# Patient Record
Sex: MALE | Race: WHITE | Employment: FULL TIME | URBAN - METROPOLITAN AREA
[De-identification: names, ages, dates, MRNs, and addresses within clinical notes are randomized per-mention and may not be internally consistent; named-entity substitution may affect disease eponyms.]

---

## 2021-07-22 ENCOUNTER — HOSPITAL ENCOUNTER (EMERGENCY)
Age: 26
Discharge: HOME | End: 2021-07-22
Payer: COMMERCIAL

## 2021-07-22 ENCOUNTER — APPOINTMENT (OUTPATIENT)
Dept: GENERAL RADIOLOGY | Age: 26
End: 2021-07-22
Payer: COMMERCIAL

## 2021-07-22 VITALS
SYSTOLIC BLOOD PRESSURE: 128 MMHG | HEIGHT: 70 IN | DIASTOLIC BLOOD PRESSURE: 74 MMHG | RESPIRATION RATE: 18 BRPM | BODY MASS INDEX: 28.06 KG/M2 | WEIGHT: 196 LBS | HEART RATE: 86 BPM | OXYGEN SATURATION: 97 % | TEMPERATURE: 98.2 F
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
## 2021-07-22 DIAGNOSIS — S63.502A SPRAIN OF LEFT WRIST, INITIAL ENCOUNTER: Primary | ICD-10-CM

## 2021-07-22 PROCEDURE — 99282 EMERGENCY DEPT VISIT SF MDM: CPT

## 2021-07-22 PROCEDURE — 29125 APPL SHORT ARM SPLINT STATIC: CPT

## 2021-07-22 PROCEDURE — 73110 X-RAY EXAM OF WRIST: CPT

## 2021-07-22 RX ORDER — IBUPROFEN 200 MG
200 TABLET ORAL EVERY 6 HOURS PRN
COMMUNITY

## 2021-07-22 ASSESSMENT — ENCOUNTER SYMPTOMS
SORE THROAT: 0
SHORTNESS OF BREATH: 0
NAUSEA: 0
CHEST TIGHTNESS: 0
BACK PAIN: 0
ABDOMINAL PAIN: 0
VOMITING: 0

## 2021-07-22 ASSESSMENT — PAIN SCALES - GENERAL: PAINLEVEL_OUTOF10: 10

## 2021-07-22 NOTE — ED PROVIDER NOTES
[address] [facility]        Pt Name: Félix Jones  MRN: [id number]  [place] 1995  Date of evaluation: 7/22/2021  Provider: MICHELLE Pryor Caro, CNP  PCP: No primary care provider on file. Note Started: 12:42 PM EDT       RON. I have evaluated this patient. My supervising physician was available for consultation. CHIEF COMPLAINT       Chief Complaint   Patient presents with    Wrist Injury     Patient in with complaints of left wrist injury that occured last night from [facility].   (Location, Timing/Onset, Context/Setting, Quality, Duration, Modifying Factors, Severity, Associated Signs and Symptoms)  Note limiting factors. Chief Complaint: Left wrist pain    Félix Jones is a 31 y.o. male who presents to the emergency department today for evaluation of a left wrist injury which occurred last night. Patient states that he was skateboarding and accidentally fell on an outstretched left hand. He is right-hand dominant. He did not hit his head or lose consciousness. He denies other injury. He currently reports a pain level of 6 out of 10. He describes his pain as constant dull and aching with sharp pains that occur with minimal movement. He has been taking OTC ibuprofen with little relief of his symptoms. He states that he does have full sensation of the left hand. He denies previous left wrist injuries. He is otherwise healthy without any chronic medical conditions. He denies recent fever, chills, or other symptoms. Nursing Notes were all reviewed and agreed with or any disagreements were addressed in the HPI. REVIEW OF SYSTEMS    (2-9 systems for level 4, 10 or more for level 5)     Review of Systems   Constitutional: Negative for chills and fever. HENT: Negative for congestion and sore throat. Eyes: Negative for visual disturbance.    Respiratory: Negative for chest tightness and shortness of breath. Cardiovascular: Negative for chest pain and palpitations. Gastrointestinal: Negative for abdominal pain, nausea and vomiting. Endocrine: Negative for polydipsia and polyuria. Genitourinary: Negative for difficulty urinating and dysuria. Musculoskeletal: Positive for arthralgias and joint swelling. Negative for back pain, gait problem, myalgias, neck pain and neck stiffness. Skin: Negative for rash and wound. Allergic/Immunologic: Negative for immunocompromised state. Neurological: Negative for dizziness. Hematological: Does not bruise/bleed easily. Psychiatric/Behavioral: Negative for suicidal ideas. Positives and Pertinent negatives as per HPI. Except as noted above in the ROS, all other systems were reviewed and negative. PAST MEDICAL HISTORY   History reviewed. No pertinent past medical history. SURGICAL HISTORY   History reviewed. No pertinent surgical history. CURRENTMEDICATIONS       Previous Medications    IBUPROFEN (ADVIL;MOTRIN) 200 MG TABLET    Take 200 mg by mouth every 6 hours as needed for Pain         ALLERGIES     Patient has no known allergies. FAMILYHISTORY     History reviewed. No pertinent family history. SOCIAL HISTORY       Social History     Tobacco Use    Smoking status: Current Every Day Smoker     Packs/day: 0.50    Smokeless tobacco: Never Used   Substance Use Topics    Alcohol use: Yes    Drug use: Never       SCREENINGS             PHYSICAL EXAM    (up to 7 for level 4, 8 or more for level 5)     ED Triage Vitals [07/22/21 1230]   BP Temp Temp Source Pulse Resp SpO2 Height Weight   128/74 98.2 °F (36.8 °C) Oral 86 18 97 % 5' 10\" (1.778 m) 196 lb (88.9 kg)       Physical Exam  Vitals and nursing note reviewed. Constitutional:       General: He is not in acute distress. Appearance: Normal appearance. He is not ill-appearing, toxic-appearing or diaphoretic. HENT:      Head: Normocephalic and atraumatic. Right Ear: External ear normal.      Left Ear: External ear normal.      Nose: Nose normal.      Mouth/Throat:      Mouth: Mucous membranes are moist.   Eyes:      General:         Right eye: No discharge. Left eye: No discharge. Extraocular Movements: Extraocular movements intact. Cardiovascular:      Rate and Rhythm: Normal rate and regular rhythm. Pulses: Normal pulses. Heart sounds: Normal heart sounds. Pulmonary:      Effort: Pulmonary effort is normal. No respiratory distress. Breath sounds: Normal breath sounds. Abdominal:      Palpations: Abdomen is soft. Musculoskeletal:      Left wrist: Swelling, tenderness and bony tenderness present. No deformity, lacerations, snuff box tenderness or crepitus. Decreased range of motion. Normal pulse. Cervical back: Normal range of motion and neck supple. Comments: Left upper extremity neurovascular status intact. Skin:     General: Skin is warm and dry. Capillary Refill: Capillary refill takes less than 2 seconds. Neurological:      General: No focal deficit present. Mental Status: He is alert and oriented to person, place, and time. Psychiatric:         Mood and Affect: Mood normal.         Behavior: Behavior normal.         Thought Content: Thought content normal.         Judgment: Judgment normal.         DIAGNOSTIC RESULTS   LABS:    Labs Reviewed - No data to display    When ordered only abnormal lab results are displayed. All other labs were within normal range or not returned as of this dictation. EKG: When ordered, EKG's are interpreted by the Emergency Department Physician in the absence of a cardiologist.  Please see their note for interpretation of EKG.     RADIOLOGY:   Non-plain film images such as CT, Ultrasound and MRI are read by the radiologist. Plain radiographic images are visualized and preliminarily interpreted by the ED Provider with the below findings:        Interpretation per the Radiologist below, if available at the time of this note:    XR WRIST LEFT (MIN 3 VIEWS)   Preliminary Result   No acute abnormality of the left wrist.           No results found. PROCEDURES   Unless otherwise noted below, none     Procedures    CRITICAL CARE TIME   N/A    CONSULTS:  None      EMERGENCY DEPARTMENT COURSE and DIFFERENTIAL DIAGNOSIS/MDM:   Vitals:    Vitals:    07/22/21 1230   BP: 128/74   Pulse: 86   Resp: 18   Temp: 98.2 °F (36.8 °C)   TempSrc: Oral   SpO2: 97%   Weight: 196 lb (88.9 kg)   Height: 5' 10\" (1.778 m)       Patient was given the following medications:  Medications - No data to display    Nursing notes reviewed. This is a 58-year-old otherwise healthy male who presents for evaluation of a left wrist injury which occurred last night. He reports falling on an outstretched hand while skateboarding. Physical exam complete. Patient arrives nontoxic, afebrile, normotensive. No signs or symptoms of acute distress noted. Left wrist x-ray interpreted by radiologist and reviewed by myself is negative for acute findings. At this time there is no evidence of any life-threatening or emergent conditions requiring immediate intervention. Patient symptoms most consistent with a wrist sprain. Velcro wrist splint applied. It was applied appropriately and the patient was neurovascularly intact as observed by myself. Patient is advised of rice technique and counseled on the importance of close outpatient follow-up. He is encouraged to take OTC Tylenol or ibuprofen for discomfort. He agrees return for high fever, incessant vomiting, severe pain, any other worsening symptoms. He is discharged home in stable condition. FINAL IMPRESSION      1. Sprain of left wrist, initial encounter    2.  Fall, initial encounter          DISPOSITION/PLAN   DISPOSITION        PATIENT REFERRED TO:  Misty Santamaria, [address] [facility] [address]  [phone]    In 2 days  If

## 2021-07-22 NOTE — ED NOTES
Pt Discharged in stable condition, VSS, no signs of distress, discharge instructions and meds reviewed. Pt verbalizes understanding and states no further questions or concerns unaddressed.        Be Gunn RN  07/22/21 1717